# Patient Record
Sex: FEMALE | Race: BLACK OR AFRICAN AMERICAN | Employment: FULL TIME | URBAN - METROPOLITAN AREA
[De-identification: names, ages, dates, MRNs, and addresses within clinical notes are randomized per-mention and may not be internally consistent; named-entity substitution may affect disease eponyms.]

---

## 2022-05-31 ENCOUNTER — HOSPITAL ENCOUNTER (OUTPATIENT)
Dept: PREADMISSION TESTING | Age: 32
Discharge: HOME | End: 2022-05-31
Payer: COMMERCIAL

## 2022-05-31 VITALS
TEMPERATURE: 98.5 F | HEIGHT: 59 IN | DIASTOLIC BLOOD PRESSURE: 86 MMHG | WEIGHT: 174.38 LBS | SYSTOLIC BLOOD PRESSURE: 123 MMHG | HEART RATE: 99 BPM | BODY MASS INDEX: 35.16 KG/M2

## 2022-05-31 LAB
ABO + RH BLD: NORMAL
BASOPHILS # BLD: 0 K/UL (ref 0–0.1)
BASOPHILS NFR BLD: 1 % (ref 0–1)
BLOOD GROUP ANTIBODIES SERPL: NORMAL
DIFFERENTIAL METHOD BLD: ABNORMAL
EOSINOPHIL # BLD: 0.2 K/UL (ref 0–0.4)
EOSINOPHIL NFR BLD: 3 % (ref 0–7)
ERYTHROCYTE [DISTWIDTH] IN BLOOD BY AUTOMATED COUNT: 15.9 % (ref 11.5–14.5)
HCG UR QL: NEGATIVE
HCT VFR BLD AUTO: 38.9 % (ref 35–47)
HGB BLD-MCNC: 11.9 G/DL (ref 11.5–16)
IMM GRANULOCYTES # BLD AUTO: 0 K/UL (ref 0–0.04)
IMM GRANULOCYTES NFR BLD AUTO: 0 % (ref 0–0.5)
LYMPHOCYTES # BLD: 2 K/UL (ref 0.8–3.5)
LYMPHOCYTES NFR BLD: 29 % (ref 12–49)
MCH RBC QN AUTO: 25.1 PG (ref 26–34)
MCHC RBC AUTO-ENTMCNC: 30.6 G/DL (ref 30–36.5)
MCV RBC AUTO: 82.1 FL (ref 80–99)
MONOCYTES # BLD: 0.5 K/UL (ref 0–1)
MONOCYTES NFR BLD: 7 % (ref 5–13)
NEUTS SEG # BLD: 4.3 K/UL (ref 1.8–8)
NEUTS SEG NFR BLD: 60 % (ref 32–75)
NRBC # BLD: 0 K/UL (ref 0–0.01)
NRBC BLD-RTO: 0 PER 100 WBC
PLATELET # BLD AUTO: 314 K/UL (ref 150–400)
PMV BLD AUTO: 11.4 FL (ref 8.9–12.9)
RBC # BLD AUTO: 4.74 M/UL (ref 3.8–5.2)
SPECIMEN EXP DATE BLD: NORMAL
WBC # BLD AUTO: 7.1 K/UL (ref 3.6–11)

## 2022-05-31 PROCEDURE — 86900 BLOOD TYPING SEROLOGIC ABO: CPT

## 2022-05-31 PROCEDURE — 36415 COLL VENOUS BLD VENIPUNCTURE: CPT

## 2022-05-31 PROCEDURE — 81025 URINE PREGNANCY TEST: CPT

## 2022-05-31 PROCEDURE — 85025 COMPLETE CBC W/AUTO DIFF WBC: CPT

## 2022-05-31 RX ORDER — ACETAMINOPHEN 325 MG/1
TABLET ORAL
COMMUNITY

## 2022-05-31 RX ORDER — IBUPROFEN 200 MG
200 TABLET ORAL
Status: ON HOLD | COMMUNITY
End: 2022-06-03 | Stop reason: SDUPTHER

## 2022-05-31 RX ORDER — NORETHINDRONE ACETATE AND ETHINYL ESTRADIOL 1; .02 MG/1; MG/1
1 TABLET ORAL DAILY
COMMUNITY

## 2022-05-31 NOTE — PERIOP NOTES
[address] INSTRUCTIONS    Surgery Date:   6/2/22    [address] [facility] staff will call you between 4 PM- 8 PM the day before surgery with your arrival time. If your surgery is on a Monday, we will call you the preceding Friday. Please call [phone] after 8 PM if you did not receive your arrival time. 1. Please report to [address] Patient Access/Admitting on the 1st floor. Bring your insurance card, photo identification, and any copayment ( if applicable). 2. If you are going home the same day of your surgery, you must have a responsible adult to drive you home. You need to have a responsible adult to stay with you the first 24 hours after surgery and you should not drive a car for 24 hours following your surgery. 3. Nothing to eat or drink after midnight the night before surgery. This includes no water, gum, mints, coffee, juice, etc.  Please note special instructions, if applicable, below for medications. 4. Do NOT drink alcohol or smoke 24 hours before surgery. STOP smoking for 14 days prior as it helps with breathing and healing after surgery. 5. If you are being admitted to the hospital, please leave personal belongings/luggage in your car until you have an assigned hospital room number. 6. Please wear comfortable clothes. Wear your glasses instead of contacts. We ask that all money, jewelry and valuables be left at home. Wear no make up, particularly mascara, the day of surgery. 7.  All body piercings, rings, and jewelry need to be removed and left at home. Please remove any nail polish or artificial nails from your fingernails. Please wear your hair loose or down. Please no pony-tails, buns, or any metal hair accessories. If you shower the morning of surgery, please do not apply any lotions or powders afterwards. You may wear deodorant, unless having breast surgery. Do not shave any body area within 24 hours of your surgery.   8. Please follow all instructions to avoid any potential surgical cancellation. 9. Should your physical condition change, (i.e. fever, cold, flu, etc.) please notify your surgeon as soon as possible. 10. It is important to be on time. If a situation occurs where you may be delayed, please call:  [phone] / [phone] on the day of surgery. 11. The Preadmission Testing staff can be reached at [phone]. 12. Special instructions: NONE      Current Outpatient Medications   Medication Sig    norethindrone-ethinyl estradiol (Junel 1/20, 21,) 1-20 mg-mcg tablet Take 1 Tablet by mouth daily.  acetaminophen (TylenoL) 325 mg tablet Take  by mouth every four (4) hours as needed for Pain.  OTHER Indications: SEA GARCIA    ibuprofen (AdviL) 200 mg tablet Take 200 mg by mouth. No current facility-administered medications for this encounter. 1. YOU MUST ONLY TAKE THESE MEDICATIONS THE MORNING OF SURGERY WITH A SIP OF WATER: NONE  2. MEDICATIONS TO TAKE THE MORNING OF SURGERY ONLY IF NEEDED: NONE  3. HOLD these prescription medications BEFORE Surgery: STOP ALL VITAMINS AND SUPPLEMENTS, AS WELL AS ADVIL TODAY. 4. Ask your surgeon/prescribing physician about when/if to STOP taking these medications: NONE  5. Stop all vitamins, herbal medicines and Aspirin containing products 7 days prior to surgery. Stop any non-steroidal anti-inflammatory drugs (i.e. Ibuprofen, Naproxen, Advil, Aleve) 3 days before surgery. You may take Tylenol. 6. If you are currently taking Plavix, Coumadin, or any other blood-thinning/anticoagulant medication contact your prescribing physician for instructions. Preventing Infections Before and After - Your Surgery    IMPORTANT INSTRUCTIONS      You play an important role in your health and preparation for surgery. To reduce the germs on your skin you will need to shower with CHG soap (Chorhexidine gluconate 4%) two times before surgery.     CHG soap (Hibiclens, Hex-A-Clens or store brand)   CHG soap will be provided at your Preadmission Testing (PAT) appointment.  If you do not have a PAT appointment before surgery, you may arrange to  CHG soap from our office or purchase CHG soap at a pharmacy, grocery or department store.  You need to purchase TWO 4 ounce bottles to use for your 2 showers. Steps to follow:  1. Wash your hair with your normal shampoo and your body with regular soap and rinse well to remove shampoo and soap from your skin. 2. Wet a clean washcloth and turn off the shower. 3. Put CHG soap on washcloth and apply to your entire body from the neck down. Do not use on your head, face or private parts(genitals). Do not use CHG soap on open sores, wounds or areas of skin irritation. 4. Wash you body gently for 5 minutes. Do not wash your skin too hard. This soap does not create lather. Pay special attention to your underarms and from your belly button to your feet. 5. Turn the shower back on and rinse well to get CHG soap off your body. 6. Pat your skin dry with a clean, dry towel. Do not apply lotions or moisturizer. 7. Put on clean clothes and sleep on fresh bed sheets and do not allow pets to sleep with you. Shower with CHG soap 2 times before your surgery   The evening before your surgery   The morning of your surgery      Tips to help prevent infections after your surgery:  1. Protect your surgical wound from germs:  ? Hand washing is the most important thing you and your caregivers can do to prevent infections. ? Keep your bandage clean and dry! ? Do not touch your surgical wound. 2. Use clean, freshly washed towels and washcloths every time you shower; do not share bath linens with others. 3. Until your surgical wound is healed, wear clothing and sleep on bed linens each day that are clean and freshly washed. 4. Do not allow pets to sleep in your bed with you or touch your surgical wound. 5. Do not smoke - smoking delays wound healing.  This may be a good time to stop smoking. 6. If you have diabetes, it is important for you to manage your blood sugar levels properly before your surgery as well as after your surgery. Poorly managed blood sugar levels slow down wound healing and prevent you from healing completely. Patient Information Regarding COVID Restrictions    Day of Procedure     Please park in the parking deck or any designated visitor parking lot.  Enter the facility through the [facility] of the [facility].   On the day of surgery, please provide the cell phone number of the person who will be waiting for you to the Patient Access representative at the time of registration.  Please wear a mask on the day of your procedure.  We are now allowing two designated visitors per stay. Pediatric patients may have 2 designated visitors. These two people may come in with you on the day of your procedure.  No visitors under the age of 13.  The designated visitor must also wear a mask.  Once your procedure and the immediate recovery period is completed, a nurse in the recovery area will contact your designated visitor to inform them of your room number or to otherwise review other pertinent information regarding your care.  Social distancing practices are to be adhered to in waiting areas and the cafeteria. The patient was contacted  in person. She verbalized understanding of all instructions does not  need reinforcement.

## 2022-06-01 ENCOUNTER — ANESTHESIA EVENT (OUTPATIENT)
Dept: SURGERY | Age: 32
End: 2022-06-01
Payer: COMMERCIAL

## 2022-06-01 NOTE — H&P
History and Physical    Rey Willis    1990    #[id number]  Encounter Summary - H&P  Date Printed:   06/01/2022         Encounter Date 05/06/2022     Chief Complaint Pre-Op Exam     History of Present Illness   Rm 1- [place] pt    34 yo G0 patient presents for pre-op exam for [address] [place] [address] Main-abdominal myomectomy with Maria E to assist.    She reports that her most recent period was different in that she felt a lot of pressure and tightness around her mid abdominal region and wrapped around her back. She felt it throughout her cycle, but has since resolved. She is at the end of her cycle currently. Denies dysuria, abnormal discharge, urinary urgency/frequency. Past Medical History Reviewed Past Medical History  Asthma: Y - Childhood     Social History Reviewed Social History  Substance Use  Do you or have you ever smoked tobacco?: Never smoker  How much tobacco do you smoke?: None  Do you or have you ever used any other forms of tobacco or nicotine?: Yes  Do you or have you ever used e-cigarettes or vape?: Current user of electronic cigarettes  Do you or have you ever used smokeless tobacco?: Never used smokeless tobacco  How much tobacco do you chew?: none  What was the date of your most recent tobacco screening?: 09/26/2018  What is your level of alcohol consumption?: Occasional  Do you use any illicit or recreational drugs?: No  Education and Occupation  Are you currently employed?: Yes  What is your occupation?: [address] [place] [address] [employer].   Diet and Exercise  What is your exercise level?: Occasional  Marriage and Sexuality  What is your relationship status?:   Are you sexually active?: Yes  How many children do you have?: 0  OB/GYN Social History  Are you working: Yes  On average, how many days per week do you engage in moderate to strenuous EXERCISE (like walking fast, running, jogging, dancing, swimming, biking, or other activities that cause a light or heavy sweat)?: 2  How often do you have a DRINK containing ALCOHOL?: 2-4 times a month  Other  Marital status: Single     Surgical History Reviewed Surgical History  Uterine myomectomy - Hysteroscopic myomectomy June 2016 (Dr Justa Elliott)     Family History   Family History not reviewed (last reviewed 02/11/2022)  Sister - Hypertensive disorder  Maternal Uncle - Hypertensive disorder  Maternal Aunt - Hypertensive disorder     Medications   Reviewed Medications  Junel FE 1/20 (28) 1 mg-20 mcg (21)/75 mg (7) tablet  TAKE 1 TABLET BY MOUTH EVERY DAY AS DIRECTED  03/25/22   prescribed     Allergies   Reviewed Allergies  NKDA     Vitals   Ht: 4 ft 11 in (149.86 cm) 05/06/2022 07:57 am  Wt: 178 lbs (80.74 kg) 05/06/2022 07:57 am  BMI: 36 05/06/2022 07:57 am  BP: 124/76 sitting L arm 05/06/2022 08:01 am     Review of Systems Additionally reports: Except as noted in the HPI, the review of systems is negative for General, Breast, , Resp, GI, CV, Endo, MS, Psych and Heme. Physical Exam   Constitutional: General Appearance: well developed and nourished. Level of Distress: no acute distress. Eyes: Eyes extraocular movements intact and sclera anicteric. Ears, Nose, Throat: Ears normal hearing. Nose: no nasal discharge. Neck: Appearance full range of motion. Lungs / Chest: Respiratory effort: unlabored. Cardiovascular: Rate And Rhythm: regular. Extremities: no edema. Extremities: Extremities: no swelling or inflammation of the extremities. Skin: General Skin warm, dry, no obvious lesions. Neurologic: Gait and Station: normal gait. Mental Status Exam: Orientation alert and oriented. Affect: appropriate affect. Mood: appropriate mood. Language and Speech: normal speech and comprehension. Assessment and Plan   1.  Uterine leiomyoma -  30yo G0 with AUB-L.    US: 6.5cm posterior fibroid intramural, 6cm anterior fibroid intramural. Right lateral 3.5cm    Hx hysteroscopic myomectomy by Dr. Karolina Hernandez  I suspect based on previous ultrasounds that she has more than just three fibroids present. Reviewed these have potential to only increase in size over time. Reviewed only definitive management (removal) that preserves the uterus is a myomectomy. If this were performed, she would need 6 -12 months of healing prior to conception. She will then be at risk of spontaneous uterine rupture, abnormal placentation that may require hysterectomy at time of birth and requirement of  section for all future pregnancies depending on the defect created. I reviewed given the large posterior fibroid that is transmural in nature, I recommended abdominal myomectomy of adequate closure of the uterine wall. US: 66mm posterior, 58mm anterior and 3.3cm R lateral leiomyomas noted    Discussed risks of abdominal myomectomy in detail including bleeding, infection, injury to surrounding tissue and possible hysterectomy. Reviewed if she did not have the fibroids removed, they may be become larger and problematic during pregnancy causing pain, potential fetal malpresentation. Reviewed plans for open abdominal myomectomy. Procedure reviewed with patient. Reviewed 1-2 days in hospital postop. Reviewed plans for probable Pfannenstiel incision but possibility of a vertical skin incision. Reviewed risks of any operation including but not limited to infection, injury to other organs including but not limited to bladder and bowel, blood loss and potential need for transfusion, DVT/PE and postoperative incisional complications including wound separation. Also reviewed were specific complications regarding myomectomies including possible entry into the uterine cavity, scarring, damage to fallopian tubes, future infertility as well as the possible need for a hysterectomy. Reviewed potential hysterectomy at the time of surgery for bleeding control, however this is incredibly rare.   All patient questions were answered, she agrees with plan of care    CBC and TSH today  [address] consents signed today    Surgery: abdominal myomectomy  D25.9: Leiomyoma of uterus, unspecified  THYROID CASCADE PROFILE  CBC, PLATELET, NO DIFFERENTIAL    2. Menorrhagia -  Likely due to know leiomyomatous uterus. Pap nilm/HPV neg 8/1019, UTD  No thyroid or CBC sent.   See management of leiomyomas above  N92.0: Excessive and frequent menstruation with regular cycle  PREGNANCY TEST, URINE  URINALYSIS, DIPSTICK    PREGNANCY TEST, URINE  Result:  - HCG: negative    URINALYSIS, DIPSTICK  Results:  - Leukocytes: Negative  - Nitrite: negative  - Urobilinogen: Normal  - Protein: 30  - pH: 5.0  - Blood: Small  - Specific Gravity: 1.030  - Ketone: Negative  - Bilirubin: Negative  - Glucose: Negative  - Appearance: Clear  - Color: Dark Yellow  Result Note: pt is on menses       Provider   Electronically Signed by: Padmini Hernandez MD

## 2022-06-02 ENCOUNTER — HOSPITAL ENCOUNTER (OUTPATIENT)
Age: 32
Setting detail: OBSERVATION
Discharge: HOME | End: 2022-06-03
Attending: OBSTETRICS & GYNECOLOGY | Admitting: OBSTETRICS & GYNECOLOGY
Payer: COMMERCIAL

## 2022-06-02 ENCOUNTER — ANESTHESIA (OUTPATIENT)
Dept: SURGERY | Age: 32
End: 2022-06-02
Payer: COMMERCIAL

## 2022-06-02 DIAGNOSIS — Z98.890 S/P MYOMECTOMY: Primary | ICD-10-CM

## 2022-06-02 LAB — HCG UR QL: NEGATIVE

## 2022-06-02 PROCEDURE — 74011250637 HC RX REV CODE- 250/637: Performed by: NURSE ANESTHETIST, CERTIFIED REGISTERED

## 2022-06-02 PROCEDURE — 77030019908 HC STETH ESOPH SIMS -A: Performed by: ANESTHESIOLOGY

## 2022-06-02 PROCEDURE — 77030016154: Performed by: OBSTETRICS & GYNECOLOGY

## 2022-06-02 PROCEDURE — 74011000258 HC RX REV CODE- 258: Performed by: OBSTETRICS & GYNECOLOGY

## 2022-06-02 PROCEDURE — 77030031139 HC SUT VCRL2 J&J -A: Performed by: OBSTETRICS & GYNECOLOGY

## 2022-06-02 PROCEDURE — 76060000038 HC ANESTHESIA 3.5 TO 4 HR: Performed by: OBSTETRICS & GYNECOLOGY

## 2022-06-02 PROCEDURE — 77030002966 HC SUT PDS J&J -A: Performed by: OBSTETRICS & GYNECOLOGY

## 2022-06-02 PROCEDURE — 77030008684 HC TU ET CUF COVD -B: Performed by: ANESTHESIOLOGY

## 2022-06-02 PROCEDURE — 76210000000 HC OR PH I REC 2 TO 2.5 HR: Performed by: OBSTETRICS & GYNECOLOGY

## 2022-06-02 PROCEDURE — 74011250636 HC RX REV CODE- 250/636: Performed by: OBSTETRICS & GYNECOLOGY

## 2022-06-02 PROCEDURE — C1765 ADHESION BARRIER: HCPCS | Performed by: OBSTETRICS & GYNECOLOGY

## 2022-06-02 PROCEDURE — 77030008459 HC STPLR SKN COOP -B: Performed by: OBSTETRICS & GYNECOLOGY

## 2022-06-02 PROCEDURE — 74011250636 HC RX REV CODE- 250/636: Performed by: ANESTHESIOLOGY

## 2022-06-02 PROCEDURE — 77030042556 HC PNCL CAUT -B: Performed by: OBSTETRICS & GYNECOLOGY

## 2022-06-02 PROCEDURE — C9290 INJ, BUPIVACAINE LIPOSOME: HCPCS | Performed by: OBSTETRICS & GYNECOLOGY

## 2022-06-02 PROCEDURE — 59025 FETAL NON-STRESS TEST: CPT

## 2022-06-02 PROCEDURE — 2709999900 HC NON-CHARGEABLE SUPPLY: Performed by: OBSTETRICS & GYNECOLOGY

## 2022-06-02 PROCEDURE — 74011250637 HC RX REV CODE- 250/637: Performed by: OBSTETRICS & GYNECOLOGY

## 2022-06-02 PROCEDURE — 74011250637 HC RX REV CODE- 250/637

## 2022-06-02 PROCEDURE — 76010000134 HC OR TIME 3.5 TO 4 HR: Performed by: OBSTETRICS & GYNECOLOGY

## 2022-06-02 PROCEDURE — 77030040830 HC CATH URETH FOL MDII -A: Performed by: OBSTETRICS & GYNECOLOGY

## 2022-06-02 PROCEDURE — 81025 URINE PREGNANCY TEST: CPT

## 2022-06-02 PROCEDURE — G0378 HOSPITAL OBSERVATION PER HR: HCPCS

## 2022-06-02 PROCEDURE — 74011000250 HC RX REV CODE- 250: Performed by: OBSTETRICS & GYNECOLOGY

## 2022-06-02 PROCEDURE — 77030040393 HC DRSG OPTIFOAM GENT MDII -B: Performed by: OBSTETRICS & GYNECOLOGY

## 2022-06-02 PROCEDURE — 77030026438 HC STYL ET INTUB CARD -A: Performed by: ANESTHESIOLOGY

## 2022-06-02 PROCEDURE — 74011250636 HC RX REV CODE- 250/636: Performed by: NURSE ANESTHETIST, CERTIFIED REGISTERED

## 2022-06-02 PROCEDURE — P9045 ALBUMIN (HUMAN), 5%, 250 ML: HCPCS | Performed by: NURSE ANESTHETIST, CERTIFIED REGISTERED

## 2022-06-02 PROCEDURE — 77030040361 HC SLV COMPR DVT MDII -B: Performed by: OBSTETRICS & GYNECOLOGY

## 2022-06-02 PROCEDURE — 77030041075 HC DRSG AG OPTIFRM MDII -B: Performed by: OBSTETRICS & GYNECOLOGY

## 2022-06-02 PROCEDURE — 77030008771 HC TU NG SALEM SUMP -A: Performed by: ANESTHESIOLOGY

## 2022-06-02 PROCEDURE — 88305 TISSUE EXAM BY PATHOLOGIST: CPT

## 2022-06-02 PROCEDURE — 77030022704 HC SUT VLOC COVD -B: Performed by: OBSTETRICS & GYNECOLOGY

## 2022-06-02 PROCEDURE — 85018 HEMOGLOBIN: CPT

## 2022-06-02 PROCEDURE — 74011250636 HC RX REV CODE- 250/636

## 2022-06-02 PROCEDURE — 74011000250 HC RX REV CODE- 250: Performed by: ANESTHESIOLOGY

## 2022-06-02 PROCEDURE — 77030002933 HC SUT MCRYL J&J -A: Performed by: OBSTETRICS & GYNECOLOGY

## 2022-06-02 PROCEDURE — 74011000250 HC RX REV CODE- 250: Performed by: NURSE ANESTHETIST, CERTIFIED REGISTERED

## 2022-06-02 RX ORDER — SODIUM CHLORIDE, SODIUM LACTATE, POTASSIUM CHLORIDE, CALCIUM CHLORIDE 600; 310; 30; 20 MG/100ML; MG/100ML; MG/100ML; MG/100ML
100 INJECTION, SOLUTION INTRAVENOUS CONTINUOUS
Status: DISCONTINUED | OUTPATIENT
Start: 2022-06-02 | End: 2022-06-02 | Stop reason: HOSPADM

## 2022-06-02 RX ORDER — SCOLOPAMINE TRANSDERMAL SYSTEM 1 MG/1
PATCH, EXTENDED RELEASE TRANSDERMAL AS NEEDED
Status: DISCONTINUED | OUTPATIENT
Start: 2022-06-02 | End: 2022-06-02 | Stop reason: HOSPADM

## 2022-06-02 RX ORDER — HYDROMORPHONE HYDROCHLORIDE 1 MG/ML
0.5 INJECTION, SOLUTION INTRAMUSCULAR; INTRAVENOUS; SUBCUTANEOUS
Status: DISCONTINUED | OUTPATIENT
Start: 2022-06-02 | End: 2022-06-02 | Stop reason: HOSPADM

## 2022-06-02 RX ORDER — DIPHENHYDRAMINE HYDROCHLORIDE 50 MG/ML
12.5 INJECTION, SOLUTION INTRAMUSCULAR; INTRAVENOUS AS NEEDED
Status: DISCONTINUED | OUTPATIENT
Start: 2022-06-02 | End: 2022-06-02 | Stop reason: HOSPADM

## 2022-06-02 RX ORDER — ALBUMIN HUMAN 50 G/1000ML
SOLUTION INTRAVENOUS AS NEEDED
Status: DISCONTINUED | OUTPATIENT
Start: 2022-06-02 | End: 2022-06-02 | Stop reason: HOSPADM

## 2022-06-02 RX ORDER — FENTANYL CITRATE 50 UG/ML
INJECTION, SOLUTION INTRAMUSCULAR; INTRAVENOUS AS NEEDED
Status: DISCONTINUED | OUTPATIENT
Start: 2022-06-02 | End: 2022-06-02 | Stop reason: HOSPADM

## 2022-06-02 RX ORDER — KETAMINE HYDROCHLORIDE 10 MG/ML
INJECTION, SOLUTION INTRAMUSCULAR; INTRAVENOUS AS NEEDED
Status: DISCONTINUED | OUTPATIENT
Start: 2022-06-02 | End: 2022-06-02 | Stop reason: HOSPADM

## 2022-06-02 RX ORDER — GLYCOPYRROLATE 0.2 MG/ML
INJECTION INTRAMUSCULAR; INTRAVENOUS AS NEEDED
Status: DISCONTINUED | OUTPATIENT
Start: 2022-06-02 | End: 2022-06-02 | Stop reason: HOSPADM

## 2022-06-02 RX ORDER — SUCCINYLCHOLINE CHLORIDE 20 MG/ML
INJECTION INTRAMUSCULAR; INTRAVENOUS AS NEEDED
Status: DISCONTINUED | OUTPATIENT
Start: 2022-06-02 | End: 2022-06-02 | Stop reason: HOSPADM

## 2022-06-02 RX ORDER — ACETAMINOPHEN 500 MG
1000 TABLET ORAL EVERY 6 HOURS
Status: DISCONTINUED | OUTPATIENT
Start: 2022-06-02 | End: 2022-06-03 | Stop reason: HOSPADM

## 2022-06-02 RX ORDER — SODIUM CHLORIDE 0.9 % (FLUSH) 0.9 %
5-40 SYRINGE (ML) INJECTION EVERY 8 HOURS
Status: DISCONTINUED | OUTPATIENT
Start: 2022-06-02 | End: 2022-06-02 | Stop reason: HOSPADM

## 2022-06-02 RX ORDER — SODIUM CHLORIDE 9 MG/ML
25 INJECTION, SOLUTION INTRAVENOUS CONTINUOUS
Status: DISCONTINUED | OUTPATIENT
Start: 2022-06-02 | End: 2022-06-02 | Stop reason: HOSPADM

## 2022-06-02 RX ORDER — SODIUM CHLORIDE 0.9 % (FLUSH) 0.9 %
5-40 SYRINGE (ML) INJECTION AS NEEDED
Status: DISCONTINUED | OUTPATIENT
Start: 2022-06-02 | End: 2022-06-02 | Stop reason: HOSPADM

## 2022-06-02 RX ORDER — ESMOLOL HYDROCHLORIDE 10 MG/ML
INJECTION INTRAVENOUS AS NEEDED
Status: DISCONTINUED | OUTPATIENT
Start: 2022-06-02 | End: 2022-06-02 | Stop reason: HOSPADM

## 2022-06-02 RX ORDER — LABETALOL HYDROCHLORIDE 5 MG/ML
10 INJECTION, SOLUTION INTRAVENOUS ONCE
Status: COMPLETED | OUTPATIENT
Start: 2022-06-02 | End: 2022-06-02

## 2022-06-02 RX ORDER — KETOROLAC TROMETHAMINE 30 MG/ML
30 INJECTION, SOLUTION INTRAMUSCULAR; INTRAVENOUS EVERY 6 HOURS
Status: DISPENSED | OUTPATIENT
Start: 2022-06-02 | End: 2022-06-03

## 2022-06-02 RX ORDER — ROCURONIUM BROMIDE 10 MG/ML
INJECTION, SOLUTION INTRAVENOUS AS NEEDED
Status: DISCONTINUED | OUTPATIENT
Start: 2022-06-02 | End: 2022-06-02 | Stop reason: HOSPADM

## 2022-06-02 RX ORDER — LIDOCAINE HYDROCHLORIDE 20 MG/ML
INJECTION, SOLUTION EPIDURAL; INFILTRATION; INTRACAUDAL; PERINEURAL AS NEEDED
Status: DISCONTINUED | OUTPATIENT
Start: 2022-06-02 | End: 2022-06-02 | Stop reason: HOSPADM

## 2022-06-02 RX ORDER — NALOXONE HYDROCHLORIDE 0.4 MG/ML
0.4 INJECTION, SOLUTION INTRAMUSCULAR; INTRAVENOUS; SUBCUTANEOUS AS NEEDED
Status: DISCONTINUED | OUTPATIENT
Start: 2022-06-02 | End: 2022-06-03 | Stop reason: HOSPADM

## 2022-06-02 RX ORDER — BISACODYL 5 MG
5 TABLET, DELAYED RELEASE (ENTERIC COATED) ORAL DAILY PRN
Status: DISCONTINUED | OUTPATIENT
Start: 2022-06-02 | End: 2022-06-03 | Stop reason: HOSPADM

## 2022-06-02 RX ORDER — LIDOCAINE HYDROCHLORIDE 10 MG/ML
0.1 INJECTION, SOLUTION EPIDURAL; INFILTRATION; INTRACAUDAL; PERINEURAL AS NEEDED
Status: DISCONTINUED | OUTPATIENT
Start: 2022-06-02 | End: 2022-06-02 | Stop reason: HOSPADM

## 2022-06-02 RX ORDER — DIPHENHYDRAMINE HCL 25 MG
25 CAPSULE ORAL
Status: DISCONTINUED | OUTPATIENT
Start: 2022-06-02 | End: 2022-06-03 | Stop reason: HOSPADM

## 2022-06-02 RX ORDER — ONDANSETRON 2 MG/ML
4 INJECTION INTRAMUSCULAR; INTRAVENOUS AS NEEDED
Status: DISCONTINUED | OUTPATIENT
Start: 2022-06-02 | End: 2022-06-02 | Stop reason: HOSPADM

## 2022-06-02 RX ORDER — CELECOXIB 200 MG/1
400 CAPSULE ORAL
Status: COMPLETED | OUTPATIENT
Start: 2022-06-02 | End: 2022-06-02

## 2022-06-02 RX ORDER — FENTANYL CITRATE 50 UG/ML
50 INJECTION, SOLUTION INTRAMUSCULAR; INTRAVENOUS AS NEEDED
Status: DISCONTINUED | OUTPATIENT
Start: 2022-06-02 | End: 2022-06-02 | Stop reason: HOSPADM

## 2022-06-02 RX ORDER — PROPOFOL 10 MG/ML
INJECTION, EMULSION INTRAVENOUS AS NEEDED
Status: DISCONTINUED | OUTPATIENT
Start: 2022-06-02 | End: 2022-06-02 | Stop reason: HOSPADM

## 2022-06-02 RX ORDER — HYDRALAZINE HYDROCHLORIDE 20 MG/ML
10 INJECTION INTRAMUSCULAR; INTRAVENOUS
Status: COMPLETED | OUTPATIENT
Start: 2022-06-02 | End: 2022-06-02

## 2022-06-02 RX ORDER — HYDRALAZINE HYDROCHLORIDE 20 MG/ML
5 INJECTION INTRAMUSCULAR; INTRAVENOUS
Status: DISCONTINUED | OUTPATIENT
Start: 2022-06-02 | End: 2022-06-02 | Stop reason: HOSPADM

## 2022-06-02 RX ORDER — SODIUM CHLORIDE 9 MG/ML
125 INJECTION, SOLUTION INTRAVENOUS CONTINUOUS
Status: DISCONTINUED | OUTPATIENT
Start: 2022-06-02 | End: 2022-06-03

## 2022-06-02 RX ORDER — PREGABALIN 75 MG/1
150 CAPSULE ORAL
Status: COMPLETED | OUTPATIENT
Start: 2022-06-02 | End: 2022-06-02

## 2022-06-02 RX ORDER — MIDAZOLAM HYDROCHLORIDE 1 MG/ML
1 INJECTION, SOLUTION INTRAMUSCULAR; INTRAVENOUS AS NEEDED
Status: DISCONTINUED | OUTPATIENT
Start: 2022-06-02 | End: 2022-06-02 | Stop reason: HOSPADM

## 2022-06-02 RX ORDER — FENTANYL CITRATE 50 UG/ML
25 INJECTION, SOLUTION INTRAMUSCULAR; INTRAVENOUS
Status: DISCONTINUED | OUTPATIENT
Start: 2022-06-02 | End: 2022-06-02 | Stop reason: HOSPADM

## 2022-06-02 RX ORDER — DEXAMETHASONE SODIUM PHOSPHATE 4 MG/ML
INJECTION, SOLUTION INTRA-ARTICULAR; INTRALESIONAL; INTRAMUSCULAR; INTRAVENOUS; SOFT TISSUE AS NEEDED
Status: DISCONTINUED | OUTPATIENT
Start: 2022-06-02 | End: 2022-06-02 | Stop reason: HOSPADM

## 2022-06-02 RX ORDER — ALBUTEROL SULFATE 90 UG/1
AEROSOL, METERED RESPIRATORY (INHALATION) AS NEEDED
Status: DISCONTINUED | OUTPATIENT
Start: 2022-06-02 | End: 2022-06-02 | Stop reason: HOSPADM

## 2022-06-02 RX ORDER — ROPIVACAINE HYDROCHLORIDE 5 MG/ML
30 INJECTION, SOLUTION EPIDURAL; INFILTRATION; PERINEURAL AS NEEDED
Status: DISCONTINUED | OUTPATIENT
Start: 2022-06-02 | End: 2022-06-02 | Stop reason: HOSPADM

## 2022-06-02 RX ORDER — SODIUM CHLORIDE 0.9 % (FLUSH) 0.9 %
5-40 SYRINGE (ML) INJECTION AS NEEDED
Status: DISCONTINUED | OUTPATIENT
Start: 2022-06-02 | End: 2022-06-03 | Stop reason: HOSPADM

## 2022-06-02 RX ORDER — ACETAMINOPHEN 325 MG/1
650 TABLET ORAL ONCE
Status: DISCONTINUED | OUTPATIENT
Start: 2022-06-02 | End: 2022-06-02 | Stop reason: HOSPADM

## 2022-06-02 RX ORDER — ACETAMINOPHEN 500 MG
1000 TABLET ORAL
Status: COMPLETED | OUTPATIENT
Start: 2022-06-02 | End: 2022-06-02

## 2022-06-02 RX ORDER — HYDROMORPHONE HYDROCHLORIDE 2 MG/ML
INJECTION, SOLUTION INTRAMUSCULAR; INTRAVENOUS; SUBCUTANEOUS AS NEEDED
Status: DISCONTINUED | OUTPATIENT
Start: 2022-06-02 | End: 2022-06-02 | Stop reason: HOSPADM

## 2022-06-02 RX ORDER — NEOSTIGMINE METHYLSULFATE 1 MG/ML
INJECTION, SOLUTION INTRAVENOUS AS NEEDED
Status: DISCONTINUED | OUTPATIENT
Start: 2022-06-02 | End: 2022-06-02 | Stop reason: HOSPADM

## 2022-06-02 RX ORDER — MIDAZOLAM HYDROCHLORIDE 1 MG/ML
0.5 INJECTION, SOLUTION INTRAMUSCULAR; INTRAVENOUS
Status: DISCONTINUED | OUTPATIENT
Start: 2022-06-02 | End: 2022-06-02 | Stop reason: HOSPADM

## 2022-06-02 RX ORDER — DEXMEDETOMIDINE HYDROCHLORIDE 100 UG/ML
INJECTION, SOLUTION INTRAVENOUS AS NEEDED
Status: DISCONTINUED | OUTPATIENT
Start: 2022-06-02 | End: 2022-06-02 | Stop reason: HOSPADM

## 2022-06-02 RX ORDER — FENTANYL CITRATE 50 UG/ML
INJECTION, SOLUTION INTRAMUSCULAR; INTRAVENOUS
Status: COMPLETED
Start: 2022-06-02 | End: 2022-06-02

## 2022-06-02 RX ORDER — MORPHINE SULFATE 2 MG/ML
2 INJECTION, SOLUTION INTRAMUSCULAR; INTRAVENOUS
Status: DISCONTINUED | OUTPATIENT
Start: 2022-06-02 | End: 2022-06-02 | Stop reason: HOSPADM

## 2022-06-02 RX ORDER — ONDANSETRON 4 MG/1
4 TABLET, ORALLY DISINTEGRATING ORAL
Status: DISCONTINUED | OUTPATIENT
Start: 2022-06-02 | End: 2022-06-03 | Stop reason: HOSPADM

## 2022-06-02 RX ORDER — MIDAZOLAM HYDROCHLORIDE 1 MG/ML
INJECTION, SOLUTION INTRAMUSCULAR; INTRAVENOUS AS NEEDED
Status: DISCONTINUED | OUTPATIENT
Start: 2022-06-02 | End: 2022-06-02 | Stop reason: HOSPADM

## 2022-06-02 RX ORDER — SODIUM CHLORIDE 0.9 % (FLUSH) 0.9 %
5-40 SYRINGE (ML) INJECTION EVERY 8 HOURS
Status: DISCONTINUED | OUTPATIENT
Start: 2022-06-02 | End: 2022-06-03 | Stop reason: HOSPADM

## 2022-06-02 RX ORDER — ONDANSETRON 2 MG/ML
INJECTION INTRAMUSCULAR; INTRAVENOUS AS NEEDED
Status: DISCONTINUED | OUTPATIENT
Start: 2022-06-02 | End: 2022-06-02 | Stop reason: HOSPADM

## 2022-06-02 RX ORDER — KETOROLAC TROMETHAMINE 30 MG/ML
INJECTION, SOLUTION INTRAMUSCULAR; INTRAVENOUS AS NEEDED
Status: DISCONTINUED | OUTPATIENT
Start: 2022-06-02 | End: 2022-06-02 | Stop reason: HOSPADM

## 2022-06-02 RX ADMIN — HYDRALAZINE HYDROCHLORIDE 10 MG: 20 INJECTION, SOLUTION INTRAMUSCULAR; INTRAVENOUS at 13:17

## 2022-06-02 RX ADMIN — LIDOCAINE HYDROCHLORIDE 0.1 ML: 10 INJECTION, SOLUTION EPIDURAL; INFILTRATION; INTRACAUDAL; PERINEURAL at 07:31

## 2022-06-02 RX ADMIN — HYDROMORPHONE HYDROCHLORIDE 0.4 MG: 2 INJECTION, SOLUTION INTRAMUSCULAR; INTRAVENOUS; SUBCUTANEOUS at 11:46

## 2022-06-02 RX ADMIN — Medication: at 12:38

## 2022-06-02 RX ADMIN — CELECOXIB 400 MG: 200 CAPSULE ORAL at 07:47

## 2022-06-02 RX ADMIN — SCOPALAMINE 1 PATCH: 1 PATCH, EXTENDED RELEASE TRANSDERMAL at 07:54

## 2022-06-02 RX ADMIN — HYDROMORPHONE HYDROCHLORIDE 0.4 MG: 2 INJECTION, SOLUTION INTRAMUSCULAR; INTRAVENOUS; SUBCUTANEOUS at 11:40

## 2022-06-02 RX ADMIN — ACETAMINOPHEN 1000 MG: 500 TABLET ORAL at 17:39

## 2022-06-02 RX ADMIN — GLYCOPYRROLATE 0.4 MG: 0.2 INJECTION, SOLUTION INTRAMUSCULAR; INTRAVENOUS at 11:18

## 2022-06-02 RX ADMIN — ACETAMINOPHEN 1000 MG: 500 TABLET ORAL at 07:47

## 2022-06-02 RX ADMIN — ALBUTEROL SULFATE 6 PUFF: 90 AEROSOL, METERED RESPIRATORY (INHALATION) at 08:16

## 2022-06-02 RX ADMIN — DEXAMETHASONE SODIUM PHOSPHATE 8 MG: 4 INJECTION, SOLUTION INTRAMUSCULAR; INTRAVENOUS at 08:23

## 2022-06-02 RX ADMIN — HYDROMORPHONE HYDROCHLORIDE 0.2 MG: 2 INJECTION, SOLUTION INTRAMUSCULAR; INTRAVENOUS; SUBCUTANEOUS at 11:25

## 2022-06-02 RX ADMIN — LABETALOL HYDROCHLORIDE 10 MG: 5 INJECTION INTRAVENOUS at 12:23

## 2022-06-02 RX ADMIN — PROPOFOL 150 MG: 10 INJECTION, EMULSION INTRAVENOUS at 08:06

## 2022-06-02 RX ADMIN — DEXMEDETOMIDINE HYDROCHLORIDE 2 MCG: 100 INJECTION, SOLUTION, CONCENTRATE INTRAVENOUS at 09:29

## 2022-06-02 RX ADMIN — PREGABALIN 150 MG: 75 CAPSULE ORAL at 07:47

## 2022-06-02 RX ADMIN — NEOSTIGMINE METHYLSULFATE 3 MG: 1 INJECTION, SOLUTION INTRAVENOUS at 11:18

## 2022-06-02 RX ADMIN — DEXMEDETOMIDINE HYDROCHLORIDE 4 MCG: 100 INJECTION, SOLUTION, CONCENTRATE INTRAVENOUS at 09:58

## 2022-06-02 RX ADMIN — KETOROLAC TROMETHAMINE 15 MG: 30 INJECTION, SOLUTION INTRAMUSCULAR; INTRAVENOUS at 11:05

## 2022-06-02 RX ADMIN — MIDAZOLAM HYDROCHLORIDE 1 MG: 1 INJECTION, SOLUTION INTRAMUSCULAR; INTRAVENOUS at 07:59

## 2022-06-02 RX ADMIN — SODIUM CHLORIDE 125 ML/HR: 9 INJECTION, SOLUTION INTRAVENOUS at 20:26

## 2022-06-02 RX ADMIN — DEXMEDETOMIDINE HYDROCHLORIDE 4 MCG: 100 INJECTION, SOLUTION, CONCENTRATE INTRAVENOUS at 09:49

## 2022-06-02 RX ADMIN — ALBUTEROL SULFATE 4 PUFF: 90 AEROSOL, METERED RESPIRATORY (INHALATION) at 11:15

## 2022-06-02 RX ADMIN — FENTANYL CITRATE 50 MCG: 50 INJECTION, SOLUTION INTRAMUSCULAR; INTRAVENOUS at 08:06

## 2022-06-02 RX ADMIN — MIDAZOLAM HYDROCHLORIDE 2 MG: 1 INJECTION, SOLUTION INTRAMUSCULAR; INTRAVENOUS at 07:56

## 2022-06-02 RX ADMIN — ROCURONIUM BROMIDE 10 MG: 10 SOLUTION INTRAVENOUS at 09:40

## 2022-06-02 RX ADMIN — PROPOFOL 50 MG: 10 INJECTION, EMULSION INTRAVENOUS at 08:09

## 2022-06-02 RX ADMIN — WATER 2 G: 1 INJECTION INTRAMUSCULAR; INTRAVENOUS; SUBCUTANEOUS at 08:30

## 2022-06-02 RX ADMIN — ALBUMIN (HUMAN) 12.5 G: 12.5 INJECTION, SOLUTION INTRAVENOUS at 09:05

## 2022-06-02 RX ADMIN — FENTANYL CITRATE 25 MCG: 50 INJECTION, SOLUTION INTRAMUSCULAR; INTRAVENOUS at 08:35

## 2022-06-02 RX ADMIN — MIDAZOLAM HYDROCHLORIDE 2 MG: 1 INJECTION, SOLUTION INTRAMUSCULAR; INTRAVENOUS at 08:05

## 2022-06-02 RX ADMIN — HYDROMORPHONE HYDROCHLORIDE 0.2 MG: 2 INJECTION, SOLUTION INTRAMUSCULAR; INTRAVENOUS; SUBCUTANEOUS at 09:38

## 2022-06-02 RX ADMIN — HYDROMORPHONE HYDROCHLORIDE 0.4 MG: 2 INJECTION, SOLUTION INTRAMUSCULAR; INTRAVENOUS; SUBCUTANEOUS at 10:35

## 2022-06-02 RX ADMIN — SODIUM CHLORIDE, POTASSIUM CHLORIDE, SODIUM LACTATE AND CALCIUM CHLORIDE: 600; 310; 30; 20 INJECTION, SOLUTION INTRAVENOUS at 10:15

## 2022-06-02 RX ADMIN — ROCURONIUM BROMIDE 20 MG: 10 SOLUTION INTRAVENOUS at 08:48

## 2022-06-02 RX ADMIN — SODIUM CHLORIDE, POTASSIUM CHLORIDE, SODIUM LACTATE AND CALCIUM CHLORIDE 100 ML/HR: 600; 310; 30; 20 INJECTION, SOLUTION INTRAVENOUS at 07:31

## 2022-06-02 RX ADMIN — FENTANYL CITRATE 25 MCG: 50 INJECTION, SOLUTION INTRAMUSCULAR; INTRAVENOUS at 12:16

## 2022-06-02 RX ADMIN — SUCCINYLCHOLINE CHLORIDE 40 MG: 20 INJECTION, SOLUTION INTRAMUSCULAR; INTRAVENOUS at 08:12

## 2022-06-02 RX ADMIN — DEXMEDETOMIDINE HYDROCHLORIDE 2 MCG: 100 INJECTION, SOLUTION, CONCENTRATE INTRAVENOUS at 08:23

## 2022-06-02 RX ADMIN — DEXMEDETOMIDINE HYDROCHLORIDE 2 MCG: 100 INJECTION, SOLUTION, CONCENTRATE INTRAVENOUS at 08:35

## 2022-06-02 RX ADMIN — HYDROMORPHONE HYDROCHLORIDE 0.2 MG: 2 INJECTION, SOLUTION INTRAMUSCULAR; INTRAVENOUS; SUBCUTANEOUS at 10:22

## 2022-06-02 RX ADMIN — ONDANSETRON HYDROCHLORIDE 4 MG: 2 INJECTION, SOLUTION INTRAMUSCULAR; INTRAVENOUS at 11:06

## 2022-06-02 RX ADMIN — HYDRALAZINE HYDROCHLORIDE 5 MG: 20 INJECTION, SOLUTION INTRAMUSCULAR; INTRAVENOUS at 12:53

## 2022-06-02 RX ADMIN — SUCCINYLCHOLINE CHLORIDE 160 MG: 20 INJECTION, SOLUTION INTRAMUSCULAR; INTRAVENOUS at 08:07

## 2022-06-02 RX ADMIN — HYDROMORPHONE HYDROCHLORIDE 0.2 MG: 2 INJECTION, SOLUTION INTRAMUSCULAR; INTRAVENOUS; SUBCUTANEOUS at 10:17

## 2022-06-02 RX ADMIN — Medication 30 MG: at 09:00

## 2022-06-02 RX ADMIN — SODIUM CHLORIDE, PRESERVATIVE FREE 10 ML: 5 INJECTION INTRAVENOUS at 22:00

## 2022-06-02 RX ADMIN — LIDOCAINE HYDROCHLORIDE 40 MG: 20 INJECTION, SOLUTION EPIDURAL; INFILTRATION; INTRACAUDAL; PERINEURAL at 08:06

## 2022-06-02 RX ADMIN — PROPOFOL 100 MG: 10 INJECTION, EMULSION INTRAVENOUS at 08:13

## 2022-06-02 RX ADMIN — SODIUM CHLORIDE 125 ML/HR: 9 INJECTION, SOLUTION INTRAVENOUS at 13:38

## 2022-06-02 RX ADMIN — DEXMEDETOMIDINE HYDROCHLORIDE 4 MCG: 100 INJECTION, SOLUTION, CONCENTRATE INTRAVENOUS at 09:46

## 2022-06-02 RX ADMIN — ROCURONIUM BROMIDE 15 MG: 10 SOLUTION INTRAVENOUS at 08:19

## 2022-06-02 RX ADMIN — ROCURONIUM BROMIDE 5 MG: 10 SOLUTION INTRAVENOUS at 08:06

## 2022-06-02 RX ADMIN — Medication 30 MG: at 20:35

## 2022-06-02 RX ADMIN — ESMOLOL HYDROCHLORIDE 20 MG: 10 INJECTION, SOLUTION INTRAVENOUS at 11:16

## 2022-06-02 RX ADMIN — FENTANYL CITRATE 25 MCG: 50 INJECTION, SOLUTION INTRAMUSCULAR; INTRAVENOUS at 08:38

## 2022-06-02 NOTE — BRIEF OP NOTE
Brief Postoperative Note    Patient: Michelle Yoder  YOB: 1990  MRN: [id number]    Date of Procedure: 6/2/2022     Pre-Op Diagnosis: FIBROIDS   MENORRHAGIA    Post-Op Diagnosis: Same as preoperative diagnosis. Procedure(s):  ABDOMINAL MYOMECTOMY    Surgeon(s):  MD Sara Resendez MD    Surgical Assistant: None    Anesthesia: General     Estimated Blood Loss (mL): 194XA    Complications: None    Specimens:   ID Type Source Tests Collected by Time Destination   1 : UTERINE FIBROIDS Fresh Uterus  Karoline Brown MD 6/2/2022 1029 Pathology        Implants: * No implants in log *    Drains: * No LDAs found *    Findings: Bimanual with marked fullness in posterior culdesac. Uterus palpates to 18cm. Pfannensteil incision. Uterus enlarged with numerous leiomyomas anteriorly, including left lateral. One large posterior 7cm leiomyoma with surrounding smaller leiomyomas. Total leiomyoma count: 23. Bilateral fallopian tube adhesions to the ovary and the uterus.      Electronically Signed by Star Anthony MD on 6/2/2022 at 11:40 AM

## 2022-06-02 NOTE — INTERVAL H&P NOTE
Update History & Physical    The Patient's History and Physical of May 6,   2022 was reviewed with the patient and I examined the patient. There was no change. The surgical site was confirmed by the patient and me. Surgery: abdominal myomectomy    Plan:  The risk, benefits, expected outcome, and alternative to the recommended procedure have been discussed with the patient. Patient understands and wants to proceed with the procedure.     Electronically signed by Jaswant Lucero MD on 6/2/2022 at 7:32 AM

## 2022-06-02 NOTE — PERIOP NOTES
Spoke with Dr Jai Rivas regarding pt's blood pressure and heart rate  No further orders rec'd  Pt may transfer to floor

## 2022-06-02 NOTE — PERIOP NOTES
TRANSFER - OUT REPORT:    Verbal report given to Marcia(name) on Raymundo Celaya  being transferred to [facility] (unit) for routine post - op       Report consisted of patients Situation, Background, Assessment and   Recommendations(SBAR). Time Pre op antibiotic given:0830  Anesthesia Stop time: 2161  Jones Present on Transfer to floor:y    Order for Jones on Chart:y  Discharge Prescriptions with Chart:n    Information from the following report(s) SBAR, OR Summary, Intake/Output and MAR was reviewed with the receiving nurse. Opportunity for questions and clarification was provided. Is the patient on 02? NO       L/Min        Other     Is the patient on a monitor? NO    Is the nurse transporting with the patient? NO    Surgical Waiting Area notified of patient's transfer from PACU?  YES      The following personal items collected during your admission accompanied patient upon transfer:   Dental Appliance: Dental Appliances: None  Vision: Visual Aid: Glasses  Hearing Aid:    Jewelry:    Clothing:    Other Valuables:    Valuables sent to safe:      Clothes and glasses to floor with pt

## 2022-06-02 NOTE — ROUTINE PROCESS
Patient: Luma Pablo MRN: [id number]  SSN: [id number]   YOB: 1990  Age: 33 y.o. Sex: female     Patient is status post Procedure(s):  ABDOMINAL MYOMECTOMY. Surgeon(s) and Role:     * Neal Issa MD - Primary     * Ghulam Stover MD - Assisting    Local/Dose/Irrigation:  20ML MARCAINE 0.5% WITH EXPAREL                   Peripheral IV 06/02/22 Anterior; Left Hand (Active)                           Dressing/Packing:  Incision 06/02/22 Abdomen-Dressing/Treatment:  (ABSORBABLE STAPLES, MASTISOL, STERI STRIPS, OPTIFOAM DRESSIN) (06/02/22 1128)    Splint/Cast:  ]    Other:  Vivi Payne

## 2022-06-02 NOTE — ANESTHESIA POSTPROCEDURE EVALUATION
Procedure(s):  ABDOMINAL MYOMECTOMY. general    Anesthesia Post Evaluation        Patient location during evaluation: PACU  Note status: Adequate. Level of consciousness: responsive to verbal stimuli and sleepy but conscious  Pain management: satisfactory to patient  Airway patency: patent  Anesthetic complications: no  Cardiovascular status: acceptable  Respiratory status: acceptable  Hydration status: acceptable  Comments: +Post-Anesthesia Evaluation and Assessment    Patient: Ami Salazar MRN: [id number]  SSN: [id number]   YOB: 1990  Age: 33 y.o. Sex: female          Cardiovascular Function/Vital Signs    BP (!) 159/116   Pulse (!) 102   Temp 37.4 °C (99.4 °F)   Resp 16   Ht 4' 11\" (1.499 m)   Wt 78.9 kg (174 lb)   SpO2 100%   BMI 35.14 kg/m²     Patient is status post Procedure(s):  ABDOMINAL MYOMECTOMY. Nausea/Vomiting: Controlled. Postoperative hydration reviewed and adequate. Pain:  Pain Scale 1:  (elevated blood pressure. nods yes when asked) (06/02/22 1216)  Pain Intensity 1: 0 (06/02/22 0709)   Managed. Neurological Status:   Neuro (WDL): Within Defined Limits (06/02/22 0710)   At baseline. Mental Status and Level of Consciousness: Arousable. Pulmonary Status:   O2 Device: Nasal cannula (06/02/22 1225)   Adequate oxygenation and airway patent. Complications related to anesthesia: None    Post-anesthesia assessment completed. No concerns. I have evaluated the patient and the patient is stable and ready to be discharged from PACU . Signed By: Trena Corcoran MD    6/2/2022        INITIAL Post-op Vital signs:   Vitals Value Taken Time   /109 06/02/22 1300   Temp 37.4 °C (99.4 °F) 06/02/22 1141   Pulse 97 06/02/22 1301   Resp 15 06/02/22 1301   SpO2 100 % 06/02/22 1301   Vitals shown include unvalidated device data.

## 2022-06-02 NOTE — PERIOP NOTES
Family (-Queta) called and informed of patient's progress at [address] [place] [address] and [facility] MD  REF: [id number]  LOT: [id number]  EXP: 9/30/26

## 2022-06-02 NOTE — OP NOTES
MRN: [id number]    PATIENT NAME: Luma Pablo       YOB: 1990    SURGEON: Nancie Alvarez MD    FIRST ASSISTANT: Angely Smith MD    SERVICE: OBSTETRICS/GYNECOLOGY    PREOPERATIVE DIAGNOSIS: uterine leiomyomas    POSTOPERATIVE DIAGNOSIS: Same    OPERATIVE PROCEDURE: abdominal myomectomy    ANESTHESIA: general    Complications none    FINDINGS: Bimanual with marked fullness in posterior culdesac. Uterus palpates to 18cm. Pfannensteil incision. Uterus enlarged with numerous leiomyomas anteriorly, including left lateral. One large posterior 7cm leiomyoma with surrounding smaller leiomyomas. Total leiomyoma count: 23. Bilateral fallopian tube adhesions to the ovary and the uterus. Incidental thermal injury to dermal skin in left lower quadrant as a heated instrument grazed the dermal layer. The endometrium was not breached at any time. Given the transmural nature, do not recommend labor in future pregnancies. Would recommend  section, scheduled. No evidence of endometriosis. ESTIMATED BLOOD LOSS: 300cc      INDICATIONS: The patient was appropriately counseled, and once informed consent was obtained, she was taken to the operating room. PROCEDURE: The patient was taken to the operating room, and placed on the operating room table in the dorsal supine position. She was prepped and draped in the usual sterile fashion after general anesthesia obtained. Jones catheter was placed. Marcaine with exparel was injected in the pfannensteil incision. A Pfannenstiel skin incision was made 2 finger breadths above the pubic symphysis and taken down to the level of the fascia with a scalpel. The fascia was incised bilaterally on either side of the midline. The fascial incision was extended bilaterally with Campbell scissors.  The superior aspect of the fascia was grasped with Kocher clamps, tented up, and the rectus muscles were dissected off of the fascia bluntly and with the aid of robin scissors. Thereafter, the inferior aspect of the fascia was grasped with Kocher clamps and the rectus muscles were dissected off to the level of the pubic bone. The peritoneum was accessed bluntly with our fingers. The lower uterine segment was identified, noted to be clear of any adhesive disease. An blayne retractor was placed. The uterus was difficult to externalize due to the socked in nature in the posterior culdesac. Therefore attention was turned to the anterior leiomyomas. Dilute vasopressin was administered under the uterine serosa where blanching was noted. The bovie was used to make a vertical midline incision. The leiomyoma was grasped with the towel clamp and elevated. Combination of blunt dissection with hemostats and coagulation with the bovie allowed for clean dissection of the 4cm leiomyoma. Through this incision, numerous other smaller leiomyomas <2cm in size were removed. The incision was then closed in three layers with 2-0 vicryl in a running fashion with excellent reapproximation. The serosa was then closed with a baseball stitch. The same procedure was performed anterior left lateral uterus and anterior lower uterine segment. With the anterior lower uterine segment, the bladder flap was created prior to incision. Next attention was turned to the posterior uterus where the same procedure was performed. This leiomyoma was approx 7cm in size and degenerated appearing. Multiple other leiomyomas were removed from this incision. The incision was closed in the same fashion. Incidental thermal injury to dermal skin in left lower quadrant as a heated instrument grazed the dermal layer. Fallopian tube adhesions were released from the posterior uterus. No evidence of endometriosis noted. Irrigation performed. Hemostasis excellent. Interseed placed posteriorly and anteriorly on the uterus. The blayne retractor was then removed.   The peritoneum was closed with a 3-0 monocryl in a running fashion. The fascia was identified, examined and noted to be intact without defects, and closed with 0 looped PDS suture in a running manner. The subcutaneous space was irrigated and any oozing was cauterized. The subcutaneous spaced was  closed with interrupted sutures given depth was  >2cm with 2-0 vicryl interrupted stitches. The skin was closed with the insorb stapler. The patient's incision site was bandaged thereafter with a pressure dressing. Dermal injury with neosporin and steri strip. Sponge, lap, and instrument counts were correct x2. The patient was in good condition at the completion of the procedure and subsequently transferred to the recovery room.     Nancie Alvarez MD

## 2022-06-02 NOTE — PROGRESS NOTES
TRANSFER - IN REPORT:    Verbal report received from Cynthia Carney 8141 RN(name) on Anshu Parker  being received from [facility]) for routine post - op      Report consisted of patients Situation, Background, Assessment and   Recommendations(SBAR). Information from the following report(s) SBAR, Kardex, Intake/Output, MAR and Recent Results was reviewed with the receiving nurse. Opportunity for questions and clarification was provided. Assessment completed upon patients arrival to unit and care assumed.

## 2022-06-02 NOTE — PROGRESS NOTES
Bedside and Verbal shift change report given to JAYLON Paris RN (oncoming nurse) by Mauricio Bay RN (offgoing nurse). Report included the following information SBAR and Kardex.

## 2022-06-03 VITALS
BODY MASS INDEX: 35.08 KG/M2 | HEIGHT: 59 IN | DIASTOLIC BLOOD PRESSURE: 89 MMHG | HEART RATE: 88 BPM | TEMPERATURE: 97.9 F | OXYGEN SATURATION: 100 % | RESPIRATION RATE: 16 BRPM | SYSTOLIC BLOOD PRESSURE: 135 MMHG | WEIGHT: 174 LBS

## 2022-06-03 LAB — HGB BLD-MCNC: 8.8 G/DL (ref 11.5–16)

## 2022-06-03 PROCEDURE — 74011250637 HC RX REV CODE- 250/637: Performed by: OBSTETRICS & GYNECOLOGY

## 2022-06-03 PROCEDURE — 85027 COMPLETE CBC AUTOMATED: CPT

## 2022-06-03 PROCEDURE — 74011250636 HC RX REV CODE- 250/636: Performed by: OBSTETRICS & GYNECOLOGY

## 2022-06-03 PROCEDURE — G0378 HOSPITAL OBSERVATION PER HR: HCPCS

## 2022-06-03 PROCEDURE — 36415 COLL VENOUS BLD VENIPUNCTURE: CPT

## 2022-06-03 PROCEDURE — 74011000250 HC RX REV CODE- 250: Performed by: OBSTETRICS & GYNECOLOGY

## 2022-06-03 RX ORDER — IBUPROFEN 600 MG/1
600 TABLET ORAL
Status: DISCONTINUED | OUTPATIENT
Start: 2022-06-03 | End: 2022-06-03 | Stop reason: HOSPADM

## 2022-06-03 RX ORDER — OXYCODONE HYDROCHLORIDE 5 MG/1
5 TABLET ORAL
Qty: 18 TABLET | Refills: 0 | Status: SHIPPED | OUTPATIENT
Start: 2022-06-03 | End: 2022-06-08

## 2022-06-03 RX ORDER — OXYCODONE HYDROCHLORIDE 5 MG/1
5 TABLET ORAL
Status: DISCONTINUED | OUTPATIENT
Start: 2022-06-03 | End: 2022-06-03 | Stop reason: HOSPADM

## 2022-06-03 RX ORDER — IBUPROFEN 200 MG
600 TABLET ORAL
Qty: 40 TABLET | Refills: 1 | Status: SHIPPED | OUTPATIENT
Start: 2022-06-03

## 2022-06-03 RX ORDER — IBUPROFEN 600 MG/1
600 TABLET ORAL
Qty: 30 TABLET | Refills: 1 | Status: SHIPPED | OUTPATIENT
Start: 2022-06-03

## 2022-06-03 RX ADMIN — ACETAMINOPHEN 1000 MG: 500 TABLET ORAL at 18:05

## 2022-06-03 RX ADMIN — Medication 30 MG: at 00:27

## 2022-06-03 RX ADMIN — BISACODYL 5 MG: 5 TABLET, COATED ORAL at 13:57

## 2022-06-03 RX ADMIN — SODIUM CHLORIDE, PRESERVATIVE FREE 10 ML: 5 INJECTION INTRAVENOUS at 05:51

## 2022-06-03 RX ADMIN — IBUPROFEN 600 MG: 600 TABLET ORAL at 13:57

## 2022-06-03 RX ADMIN — OXYCODONE 5 MG: 5 TABLET ORAL at 18:19

## 2022-06-03 RX ADMIN — ACETAMINOPHEN 1000 MG: 500 TABLET ORAL at 05:46

## 2022-06-03 RX ADMIN — SODIUM CHLORIDE 125 ML/HR: 9 INJECTION, SOLUTION INTRAVENOUS at 04:14

## 2022-06-03 RX ADMIN — ACETAMINOPHEN 1000 MG: 500 TABLET ORAL at 08:33

## 2022-06-03 RX ADMIN — Medication 30 MG: at 05:45

## 2022-06-03 RX ADMIN — ACETAMINOPHEN 1000 MG: 500 TABLET ORAL at 00:31

## 2022-06-03 NOTE — DISCHARGE INSTRUCTIONS
Abdominal Myomectomy: What to Expect at [address] myomectomy is surgery to take out fibroids from the uterus. Your doctor made a cut (incision) in your lower belly to remove the fibroids. You can expect to feel better and stronger each day. But you may tire quickly and need pain medicine for a week or two. You may need about 4 to 6 weeks to fully recover. Don't lift anything heavy while you are recovering. Give your incision and your belly muscles time to heal.  This care sheet gives you a general idea about how long it will take for you to recover. But each person recovers at a different pace. Follow the steps below to get better as quickly as possible. How can you care for yourself at home? Activity    · Rest when you feel tired. Getting enough sleep will help you recover.     · Try to walk each day. Start out by walking a little more than you did the day before. Bit by bit, increase the amount you walk. Walking boosts blood flow and helps prevent pneumonia and constipation.     · For 4 to 6 weeks, avoid lifting anything that would make you strain. This may include a child, heavy grocery bags and milk containers, a heavy briefcase or backpack, cat litter or dog food bags, or a vacuum .     · Avoid strenuous activities, such as biking, jogging, weightlifting, and aerobic exercise, for 4 to 6 weeks.     · You may shower. Pat the incision dry when you are done. Do not take a bath for the first week after surgery or until your doctor tells you it is okay.     · Ask your doctor when you can drive again.     · You will probably need to take 2 to 4 weeks off work. It depends on the type of work you do and how you feel.     · Ask your doctor when it is okay for you to have sex or use tampons. Do not douche. Diet    · You can eat your normal diet.  If your stomach is upset, try bland, low-fat foods like plain rice, broiled chicken, toast, and yogurt.     · Drink plenty of fluids (unless your doctor tells you not to).     · You may notice that your bowel movements are not regular right after your surgery. This is common. Try to avoid constipation and straining with bowel movements. You may want to take a fiber supplement every day. If you have not had a bowel movement after a couple of days, ask your doctor about taking a mild laxative. Medicines    · Your doctor will tell you if and when you can restart your medicines. You will also get instructions about taking any new medicines.     · If you take aspirin or some other blood thinner, ask your doctor if and when to start taking it again. Make sure that you understand exactly what your doctor wants you to do.     · Take pain medicines exactly as directed. ? If the doctor gave you a prescription medicine for pain, take it as prescribed. ? If you are not taking a prescription pain medicine, take an over-the-counter medicine such as acetaminophen (Tylenol), ibuprofen (Advil, Motrin), or naproxen (Aleve). Read and follow all instructions on the label. Do not take two or more pain medicines at the same time unless the doctor told you to. Many pain medicines contain acetaminophen, which is Tylenol. Too much acetaminophen (Tylenol) can be harmful.     · If you think your pain medicine is making you sick to your stomach:  ? Take your medicine after meals (unless your doctor tells you not to). ? Ask your doctor for a different pain medicine.     · If your doctor prescribed antibiotics, take them as directed. Do not stop taking them just because you feel better. You need to take the full course of antibiotics. Incision care    · If you have strips of tape on the cut (incision) the doctor made, leave the tape on for a week or until it falls off.     · Wash the area daily with warm, soapy water and pat it dry.     · Keep the area clean and dry. You may cover it with a gauze bandage if it weeps or rubs against clothing. Change the bandage every day.    Other instructions    · Talk to your doctor if you want to try to get pregnant soon. Your doctor can tell you when it's safe to do so. If you don't want to get pregnant, talk with your doctor about birth control.     · You may have some light vaginal bleeding. Wear sanitary pads if needed.     · Wear loose, comfortable clothing and avoid anything that puts pressure on your belly for a few weeks. Follow-up care is a key part of your treatment and safety. Be sure to make and go to all appointments, and call your doctor if you are having problems. It's also a good idea to know your test results and keep a list of the medicines you take. When should you call for help? Call 911 anytime you think you may need emergency care. For example, call if:    · You passed out (lost consciousness).     · You have chest pain, are short of breath, or cough up blood. Call your doctor now or seek immediate medical care if:    · You have pain that does not get better after you take pain medicine.     · You cannot pass stools or gas.     · You have vaginal discharge that has increased in amount or smells bad.     · You are sick to your stomach or cannot drink fluids.     · You have loose stitches, or your incision comes open.     · Bright red blood has soaked through the bandage over your incision.     · You have signs of infection, such as:  ? Increased pain, swelling, warmth, or redness. ? Red streaks leading from the incision. ? Pus draining from the incision. ? A fever.     · You have bright red vaginal bleeding that soaks one or more pads in an hour, or you have large clots.     · You have signs of a blood clot in your leg (called a deep vein thrombosis), such as:  ? Pain in your calf, back of the knee, thigh, or groin. ? Redness and swelling in your leg or groin. Watch closely for any changes in your health, and be sure to contact your doctor if you have any problems. Where can you learn more?   Go to http://www.gray.com/  Enter F972 in the search box to learn more about \"Abdominal Myomectomy: What to Expect at Home. \"  Current as of: November 22, 2021               Content Version: 13.2  © 5752-1088 [employer]. Care instructions adapted under license by [employer] (which disclaims liability or warranty for this information).  If you have questions about a medical condition or this instruction, always ask your healthcare professional. [place] [address] any warranty or liability for your use of this information.

## 2022-06-03 NOTE — PROGRESS NOTES
[place] [facility] Observation Notice (Ana De Oliveira) provided to patient/representative with verbal explanation of the notice. Time allotted for questions regarding the notice. Patient /representative provided a completed copy of the VOON notice. Copy placed on bedside chart. [place] Hush, Care Management Assistant.

## 2022-06-03 NOTE — PROGRESS NOTES
Gynecology Post Operative Note    Meredith Flores    Assessment: Pt is a 32 yo POD#1 s/p abdominal myomectomy    Plan:   Routine post-op care  -Advance diet  -DC PCA  -HLIV  -Oral pain medications  - AM CBC pending  -Ambulate    HTN  - Continue to monitor -- may need further followup outpt    Post-op day 1 from Procedure(s):  ABDOMINAL MYOMECTOMY  She is without significant complaints. Some discomfort but manageable. Pain controlled on current medication. Jones out but patient has not voided yet. Patient is not passing flatus. She is is tolerating her diet but she has not been very hungry. She does feel like her abdomen is 'sticking out' more. Orders/Charges: N/A    Vitals:  Visit Vitals  /85 (BP 1 Location: Right upper arm, BP Patient Position: At rest)   Pulse 97   Temp 98.6 °F (37 °C)   Resp 16   Ht 4' 11\" (1.499 m)   Wt 78.9 kg (174 lb)   LMP 2022 (Exact Date)   SpO2 96%   BMI 35.14 kg/m²     Temp (24hrs), Av.6 °F (37 °C), Min:98 °F (36.7 °C), Max:99.4 °F (37.4 °C)      Last 24hr Input/Output:    Intake/Output Summary (Last 24 hours) at 6/3/2022 0802  Last data filed at 6/3/2022 0550  Gross per 24 hour   Intake 2270 ml   Output 2400 ml   Net -130 ml          Exam:  Patient without distress. Lungs clear              Abdomen soft, bowel sounds present, expected tenderness. Incision dry and clean without erythema. Lower extremities are negative for swelling, cords, or tenderness.     Labs:   Lab Results   Component Value Date/Time    WBC 11.0 2022 04:15 AM    WBC 7.1 2022 03:08 PM    HGB 10.2 (L) 2022 04:15 AM    HGB 11.9 2022 03:08 PM    HCT 31.5 (L) 2022 04:15 AM    HCT 38.9 2022 03:08 PM    PLATELET 386  04:15 AM    PLATELET 498 60/10/9296 03:08 PM       Recent Results (from the past 24 hour(s))   HCG URINE, QL. - POC    Collection Time: 22  8:06 AM   Result Value Ref Range    Pregnancy test,urine (POC) Negative NEG     POC HEMOGLOBIN    Collection Time: 06/02/22 10:54 AM   Result Value Ref Range    Hemoglobin (POC) 8.8 (L) 11.5 - 16.0 g/dL

## 2022-06-03 NOTE — PROGRESS NOTES
Gynecology Progress Note    Raymundo Celaya    Assesment: POD #1 s/p abdominal myomectomy    Plan: Discharge home pending CBC results. She is without significant complaints. Pain controlled on current medication. Voiding without difficulty. Patient is not passing flatus. She is is tolerating her diet without issue. She has ambulated multiple times. Vitals:  Visit Vitals  /86 (BP 1 Location: Right upper arm)   Pulse 88   Temp 98.5 °F (36.9 °C)   Resp 18   Ht 4' 11\" (1.499 m)   Wt 78.9 kg (174 lb)   LMP 2022 (Exact Date)   SpO2 98%   BMI 35.14 kg/m²     Temp (24hrs), Av.7 °F (37.1 °C), Min:98.2 °F (36.8 °C), Max:99.4 °F (37.4 °C)      Last 24hr Input/Output:    Intake/Output Summary (Last 24 hours) at 6/3/2022 1637  Last data filed at 6/3/2022 0550  Gross per 24 hour   Intake --   Output 1700 ml   Net -1700 ml          Exam:  General: alert, cooperative, no distress, appears stated age     Lung: Normal respiratory effort        Abdomen: abdomen is soft without significant tenderness, masses, organomegaly or guarding. BS+ in all four quadrants     Extremities: extremities normal, atraumatic, no cyanosis or edema      Labs:   Lab Results   Component Value Date/Time    WBC 11.0 2022 04:15 AM    WBC 7.1 2022 03:08 PM    HGB 10.2 (L) 2022 04:15 AM    HGB 11.9 2022 03:08 PM    HCT 31.5 (L) 2022 04:15 AM    HCT 38.9 2022 03:08 PM    PLATELET 968  04:15 AM    PLATELET 605  03:08 PM       No results found for this or any previous visit (from the past 24 hour(s)).

## 2022-06-06 LAB
ERYTHROCYTE [DISTWIDTH] IN BLOOD BY AUTOMATED COUNT: 16.3 % (ref 11.5–14.5)
HCT VFR BLD AUTO: 31.5 % (ref 35–47)
HGB BLD-MCNC: 10.2 G/DL (ref 11.5–16)
MCH RBC QN AUTO: 26.2 PG (ref 26–34)
MCHC RBC AUTO-ENTMCNC: 32.4 G/DL (ref 30–36.5)
MCV RBC AUTO: 80.8 FL (ref 80–99)
NRBC # BLD: 0 K/UL (ref 0–0.01)
NRBC BLD-RTO: 0 PER 100 WBC
PLATELET # BLD AUTO: 253 K/UL (ref 150–400)
PMV BLD AUTO: 11.2 FL (ref 8.9–12.9)
RBC # BLD AUTO: 3.9 M/UL (ref 3.8–5.2)
WBC # BLD AUTO: 11 K/UL (ref 3.6–11)

## 2023-05-25 RX ORDER — IBUPROFEN 600 MG/1
600 TABLET ORAL EVERY 6 HOURS PRN
COMMUNITY
Start: 2022-06-03

## 2023-05-25 RX ORDER — IBUPROFEN 200 MG
600 TABLET ORAL EVERY 6 HOURS PRN
COMMUNITY
Start: 2022-06-03

## 2023-05-25 RX ORDER — NORETHINDRONE ACETATE AND ETHINYL ESTRADIOL 1; .02 MG/1; MG/1
1 TABLET ORAL DAILY
COMMUNITY

## 2023-05-25 RX ORDER — ACETAMINOPHEN 325 MG/1
TABLET ORAL EVERY 4 HOURS PRN
COMMUNITY

## (undated) DEVICE — PACK,BASIC,SIRUS,V: Brand: MEDLINE

## (undated) DEVICE — SUTURE VLOC 90 2/0 VL 6 GS-22 VLOCM2105

## (undated) DEVICE — GLOVE SURG SZ 7 L12IN FNGR THK79MIL GRN LTX FREE

## (undated) DEVICE — BLADE ASSEMB CLP HAIR FINE --

## (undated) DEVICE — SUTURE PDS II SZ 0 L60IN ABSRB VLT L48MM CTX 1/2 CIR Z990G

## (undated) DEVICE — GARMENT,MEDLINE,DVT,INT,CALF,MED, GEN2: Brand: MEDLINE

## (undated) DEVICE — PREMIUM WET SKIN PREP TRAY: Brand: MEDLINE INDUSTRIES, INC.

## (undated) DEVICE — SUTURE VCRL 2-0 L27IN ABSRB CT BRAID COAT UD J275H

## (undated) DEVICE — GOWN,SIRUS,NONRNF,SETINSLV,XL,20/CS: Brand: MEDLINE

## (undated) DEVICE — TOWEL SURG W17XL27IN STD BLU COT NONFENESTRATED PREWASHED

## (undated) DEVICE — SUTURE MCRYL SZ 4-0 L27IN ABSRB UD L24MM PS-1 3/8 CIR PRIM Y935H

## (undated) DEVICE — SUTURE MCRYL SZ 3-0 L36IN ABSRB UD L36MM CT-1 1/2 CIR Y944H

## (undated) DEVICE — DRESSING FOAM W4XL10IN AG SIL ADH ANTIMIC POSTOP OPTIFOAM

## (undated) DEVICE — SUTURE MCRYL SZ 4-0 L27IN ABSRB UD L19MM PS-2 1/2 CIR PRIM Y426H

## (undated) DEVICE — INTENDED FOR TISSUE SEPARATION, AND OTHER PROCEDURES THAT REQUIRE A SHARP SURGICAL BLADE TO PUNCTURE OR CUT.: Brand: BARD-PARKER ® CARBON RIB-BACK BLADES

## (undated) DEVICE — GLOVE ORANGE PI 7   MSG9070

## (undated) DEVICE — PAD,NON-ADHERENT,W/AD,3X4,ST,LF,1/PK: Brand: MEDLINE

## (undated) DEVICE — WOUND RETRACTOR AND PROTECTOR: Brand: ALEXIS WOUND PROTECTOR-RETRACTOR

## (undated) DEVICE — PENCIL SMK EVAC 10 FT BLADE ELECTRD ROCKER FOR TELSCP

## (undated) DEVICE — BASIN ST MAJOR-NO CAUTERY: Brand: MEDLINE INDUSTRIES, INC.

## (undated) DEVICE — PREP SKN CHLRAPRP APL 26ML STR --

## (undated) DEVICE — HYPODERMIC SAFETY NEEDLE: Brand: MAGELLAN

## (undated) DEVICE — REM POLYHESIVE ADULT PATIENT RETURN ELECTRODE: Brand: VALLEYLAB

## (undated) DEVICE — DRESSING ANTIMIC FOAM OPTIFOAM POSTOP ADH 4 X 6 IN

## (undated) DEVICE — STAPLER SKIN SQ 30 ABSRB STPL DISP INSORB

## (undated) DEVICE — DRESSING ANTIMIC FOAM OPTIFOAM POSTOP ADH 4X14 IN

## (undated) DEVICE — GLOVE SURG SZ 65 L12IN FNGR THK94MIL STD WHT LTX FREE

## (undated) DEVICE — BARRIER TISS ADH ABSRB 3X4IN -- GYNECARE INTERCEED

## (undated) DEVICE — SPONGE LAP 18X18IN STRL -- 5/PK

## (undated) DEVICE — PAD,SANITARY,11 IN,MAXI,N-STRL,IND WRAP: Brand: MEDLINE

## (undated) DEVICE — SYR 20ML LL STRL LF --

## (undated) DEVICE — TOTAL TRAY, DB, 100% SILI FOLEY, 16FR 10: Brand: MEDLINE

## (undated) DEVICE — DECANTER BAG 9": Brand: MEDLINE INDUSTRIES, INC.